# Patient Record
Sex: MALE | Race: WHITE | Employment: FULL TIME | URBAN - METROPOLITAN AREA
[De-identification: names, ages, dates, MRNs, and addresses within clinical notes are randomized per-mention and may not be internally consistent; named-entity substitution may affect disease eponyms.]

---

## 2024-04-08 ENCOUNTER — LAB ENCOUNTER (OUTPATIENT)
Dept: LAB | Facility: REFERENCE LAB | Age: 49
End: 2024-04-08
Attending: FAMILY MEDICINE
Payer: COMMERCIAL

## 2024-04-08 ENCOUNTER — OFFICE VISIT (OUTPATIENT)
Facility: CLINIC | Age: 49
End: 2024-04-08
Payer: COMMERCIAL

## 2024-04-08 VITALS
BODY MASS INDEX: 24.62 KG/M2 | DIASTOLIC BLOOD PRESSURE: 86 MMHG | SYSTOLIC BLOOD PRESSURE: 128 MMHG | OXYGEN SATURATION: 98 % | WEIGHT: 172 LBS | HEIGHT: 70 IN | HEART RATE: 94 BPM

## 2024-04-08 DIAGNOSIS — Z00.00 PHYSICAL EXAM, ANNUAL: Primary | ICD-10-CM

## 2024-04-08 DIAGNOSIS — E78.5 DYSLIPIDEMIA: ICD-10-CM

## 2024-04-08 DIAGNOSIS — K21.9 GASTROESOPHAGEAL REFLUX DISEASE, UNSPECIFIED WHETHER ESOPHAGITIS PRESENT: ICD-10-CM

## 2024-04-08 DIAGNOSIS — Z12.11 COLON CANCER SCREENING: ICD-10-CM

## 2024-04-08 DIAGNOSIS — Z23 NEED FOR VACCINATION: ICD-10-CM

## 2024-04-08 LAB
ALBUMIN SERPL-MCNC: 4.5 G/DL (ref 3.2–4.8)
ALBUMIN/GLOB SERPL: 1.7 {RATIO} (ref 1–2)
ALP LIVER SERPL-CCNC: 87 U/L
ALT SERPL-CCNC: 22 U/L
ANION GAP SERPL CALC-SCNC: 7 MMOL/L (ref 0–18)
AST SERPL-CCNC: 26 U/L (ref ?–34)
BILIRUB SERPL-MCNC: 0.9 MG/DL (ref 0.3–1.2)
BUN BLD-MCNC: 16 MG/DL (ref 9–23)
BUN/CREAT SERPL: 16 (ref 10–20)
CALCIUM BLD-MCNC: 9.2 MG/DL (ref 8.7–10.4)
CHLORIDE SERPL-SCNC: 109 MMOL/L (ref 98–112)
CHOLEST SERPL-MCNC: 196 MG/DL (ref ?–200)
CO2 SERPL-SCNC: 26 MMOL/L (ref 21–32)
CREAT BLD-MCNC: 1 MG/DL
EGFRCR SERPLBLD CKD-EPI 2021: 93 ML/MIN/1.73M2 (ref 60–?)
EST. AVERAGE GLUCOSE BLD GHB EST-MCNC: 105 MG/DL (ref 68–126)
FASTING PATIENT LIPID ANSWER: YES
FASTING STATUS PATIENT QL REPORTED: YES
GLOBULIN PLAS-MCNC: 2.6 G/DL (ref 2.8–4.4)
GLUCOSE BLD-MCNC: 85 MG/DL (ref 70–99)
HBA1C MFR BLD: 5.3 % (ref ?–5.7)
HDLC SERPL-MCNC: 32 MG/DL (ref 40–59)
LDLC SERPL CALC-MCNC: 149 MG/DL (ref ?–100)
NONHDLC SERPL-MCNC: 164 MG/DL (ref ?–130)
OSMOLALITY SERPL CALC.SUM OF ELEC: 294 MOSM/KG (ref 275–295)
POTASSIUM SERPL-SCNC: 4.1 MMOL/L (ref 3.5–5.1)
PROT SERPL-MCNC: 7.1 G/DL (ref 5.7–8.2)
SODIUM SERPL-SCNC: 142 MMOL/L (ref 136–145)
TRIGL SERPL-MCNC: 81 MG/DL (ref 30–149)
VLDLC SERPL CALC-MCNC: 15 MG/DL (ref 0–30)

## 2024-04-08 PROCEDURE — 80061 LIPID PANEL: CPT | Performed by: FAMILY MEDICINE

## 2024-04-08 PROCEDURE — 36415 COLL VENOUS BLD VENIPUNCTURE: CPT | Performed by: FAMILY MEDICINE

## 2024-04-08 PROCEDURE — 80053 COMPREHEN METABOLIC PANEL: CPT | Performed by: FAMILY MEDICINE

## 2024-04-08 PROCEDURE — 90715 TDAP VACCINE 7 YRS/> IM: CPT | Performed by: FAMILY MEDICINE

## 2024-04-08 PROCEDURE — 90471 IMMUNIZATION ADMIN: CPT | Performed by: FAMILY MEDICINE

## 2024-04-08 PROCEDURE — 99386 PREV VISIT NEW AGE 40-64: CPT | Performed by: FAMILY MEDICINE

## 2024-04-08 PROCEDURE — 83036 HEMOGLOBIN GLYCOSYLATED A1C: CPT | Performed by: FAMILY MEDICINE

## 2024-04-08 NOTE — PROGRESS NOTES
Sam Pruitt is a 48 year old male who presents for a complete physical exam.   HPI:     Last PCP visit was 3 years ago.     Hx of HLD. Became a pescartarian 4 years ago. No prior medications.     Hx of GERD. Was on omeprazole many years ago. Had EGD in 03/2023 and normal per patient.     Home BP usually 120s/70s.     Concerns: above  Last colonoscopy:  Never  Last PSA: Never  Diet/exercise: Goes to gym 3-4x/wk. Eats well. See above.   Substance abuse: None  Vaccines- Tdap: likely 2005, Covid: due for booster    REVIEW OF SYSTEMS:   GENERAL: feels well otherwise   SKIN: denies any unusual skin lesions  EYES:denies vision change  HEENT: no hearing changes, negative  LUNGS: denies shortness of breath with exertion  CARDIOVASCULAR: denies chest pain on exertion  GI: denies abdominal pain, constipation or diarrhea. No hematochezia or melena  : denies nocturia or changes in stream  NEURO: denies headaches  PSYCHE: denies depression or anxiety    No current outpatient medications on file.      Past Medical History:   Diagnosis Date    Anxiety 04/01/2011    Mild-Moderate sometimes    Cancer (HCC) 06/01/2015    Basal Cell Skin Cancer once; runs in family    Esophageal reflux 08/01/2012    I used to take Omoprazole.  Last endoscopy was March, 2023    Hyperlipidemia 08/01/1999    It's been up and down, as high as 240 three years ago but never taken medication      Past Surgical History:   Procedure Laterality Date    SKIN SURGERY  06/01/2015    Basal Cell Skin Cancer    VASECTOMY  09/11/2015      Family History   Problem Relation Age of Onset    Depression Mother         Mild and treated    Heart Disorder Mother         Atrial Fibrillation - has pacemaker    Prostate Cancer Maternal Uncle       Social History:  Social History     Socioeconomic History    Marital status:    Tobacco Use    Smoking status: Former    Smokeless tobacco: Never    Tobacco comments:     On and off for 10 years but never again after 9/22/05    Substance and Sexual Activity    Alcohol use: Yes     Alcohol/week: 12.0 standard drinks of alcohol     Types: 2 Glasses of wine, 10 Cans of beer per week    Drug use: Not Currently     Types: Cannabis     Comment: Once in a while, not frequent at all   Other Topics Concern    Caffeine Concern No    Exercise No    Seat Belt No    Special Diet Yes     Comment: Pescatarian for 4 years    Stress Concern No    Weight Concern No           EXAM:     Wt Readings from Last 6 Encounters:   04/08/24 172 lb (78 kg)     Body mass index is 24.68 kg/m².    /86   Pulse 94   Ht 5' 10\" (1.778 m)   Wt 172 lb (78 kg)   SpO2 98%   BMI 24.68 kg/m²      GENERAL: well developed, well nourished, in no apparent distress  SKIN: no rashes, no suspicious lesions  HEENT: atraumatic, normocephalic, MMM, nose normal, Tms & EACs normal  EYES: PERRLA, EOMI, no conjunctival injection  NECK: supple, no adenopathy   LUNGS: CTA b/l, no w/r/r  CARDIO: RRR without murmur  GI: normoactive bowel sounds, NT/ND, no masses, no HSM  EXTREMITIES: no cyanosis, clubbing or edema  NEURO: Alert & oriented, motor and sensory are grossly intact    No results found for: \"CHOLEST\", \"HDL\", \"LDL\", \"TRIGLY\", \"AST\", \"ALT\"   ASSESSMENT AND PLAN:   Sam Pruitt is a 48 year old male who presents for a complete physical exam.    Encounter Diagnoses   Name Primary?    Physical exam, annual Yes    Dyslipidemia     Gastroesophageal reflux disease, unspecified whether esophagitis present     Colon cancer screening     Need for vaccination      Orders Placed This Encounter   Procedures    Hemoglobin A1C    Comp Metabolic Panel (14)    Lipid Panel    TdaP (Boostrix/Adacel) Vaccine (> 7 Y)    Immunization Admin Counseling, 1st Component, 18 years and older       -Check baseline labs.  -May use omeprazole prn for GERD.  -GI referral for colonoscopy.   -Tdap given today.     Discussed with patient the following:  -Risks and benefits of screening for prostate  cancer:  -Colon cancer screening   -Healthy diet including adequate intake of vegetables and fruits, appropriate portion sizes, minimizing highly concentrated carbohydrate foods  -Exercising 30 minutes a day most days of the week   -Importance of regular exercise and weight maintenance/loss  -Diabetes screening   -Cholesterol screening   -Recommendation for yearly influenza vaccine  -Need for Tdap once as an adult and Td booster every 10 years    Meds & Refills for this Visit:  Requested Prescriptions      No prescriptions requested or ordered in this encounter       Imaging & Consults:  TETANUS, DIPHTHERIA TOXOIDS AND ACELLULAR PERTUSIS VACCINE (TDAP), >7 YEARS, IM USE  OP REFERRAL TO [facility] TELEPHONE COLON SCREEN    Return in 1 year for annual physical or sooner as needed.     Lucio Simeon DO  04/08/24   2:48 PM

## 2024-08-02 ENCOUNTER — HOSPITAL ENCOUNTER (OUTPATIENT)
Age: 49
Discharge: HOME | End: 2024-08-02
Payer: COMMERCIAL

## 2024-08-02 ENCOUNTER — NURSE TRIAGE (OUTPATIENT)
Facility: CLINIC | Age: 49
End: 2024-08-02

## 2024-08-02 VITALS
DIASTOLIC BLOOD PRESSURE: 89 MMHG | HEART RATE: 70 BPM | OXYGEN SATURATION: 100 % | SYSTOLIC BLOOD PRESSURE: 134 MMHG | TEMPERATURE: 98 F | RESPIRATION RATE: 18 BRPM

## 2024-08-02 DIAGNOSIS — L03.114 CELLULITIS OF LEFT UPPER EXTREMITY: Primary | ICD-10-CM

## 2024-08-02 PROCEDURE — 99203 OFFICE O/P NEW LOW 30 MIN: CPT | Performed by: NURSE PRACTITIONER

## 2024-08-02 RX ORDER — CEPHALEXIN 500 MG/1
500 CAPSULE ORAL 3 TIMES DAILY
Qty: 30 CAPSULE | Refills: 0 | Status: SHIPPED | OUTPATIENT
Start: 2024-08-02 | End: 2024-08-12

## 2024-08-02 NOTE — ED INITIAL ASSESSMENT (HPI)
Pt here with an insect bite to left upper extremity that he woke up with on Tuesday. Area is red, inflamed and itchy. Denies pain.

## 2024-08-02 NOTE — ED PROVIDER NOTES
Patient Seen in: [facility]      History     Chief Complaint   Patient presents with    Bite Sting,Insect     Stated Complaint: Bite or stung welt growing    Subjective:   HPI  Patient is a 48-year-old male who presents to the immediate care center with concern for redness and swelling to the medial aspect of his left upper arm.  This has been persistent and worsening over the last 3 days.  He cannot recall a provoking agent, but is concern for an insect bite.  He has had no fever or chills; no myalgia or arthralgia; no motor or sensory deficit.        Objective:   Past Medical History:    Anxiety    Mild-Moderate sometimes    Cancer (HCC)    Basal Cell Skin Cancer once; runs in family    Esophageal reflux    I used to take Omoprazole.  Last endoscopy was March, 2023    Hyperlipidemia    It's been up and down, as high as 240 three years ago but never taken medication              Past Surgical History:   Procedure Laterality Date    Skin surgery  06/01/2015    Basal Cell Skin Cancer    Vasectomy  09/11/2015                Social History     Socioeconomic History    Marital status:    Tobacco Use    Smoking status: Former    Smokeless tobacco: Never    Tobacco comments:     On and off for 10 years but never again after 9/22/05   Substance and Sexual Activity    Alcohol use: Yes     Alcohol/week: 12.0 standard drinks of alcohol     Types: 2 Glasses of wine, 10 Cans of beer per week    Drug use: Not Currently     Types: Cannabis     Comment: Once in a while, not frequent at all   Other Topics Concern    Caffeine Concern No    Exercise No    Seat Belt No    Special Diet Yes     Comment: Pescatarian for 4 years    Stress Concern No    Weight Concern No              Review of Systems   Constitutional:  Negative for appetite change, chills and fever.   Musculoskeletal:  Negative for arthralgias and myalgias.   Skin:  Positive for rash.   Neurological:  Negative for weakness and numbness.        Positive for stated Chief Complaint: Bite Sting,Insect    Other systems are as noted in HPI.  Constitutional and vital signs reviewed.      All other systems reviewed and negative except as noted above.    Physical Exam     ED Triage Vitals [08/02/24 1707]   /89   Pulse 70   Resp 18   Temp 98.4 °F (36.9 °C)   Temp src Temporal   SpO2 100 %   O2 Device None (Room air)       Current Vitals:   Vital Signs  BP: 134/89  Pulse: 70  Resp: 18  Temp: 98.4 °F (36.9 °C)  Temp src: Temporal    Oxygen Therapy  SpO2: 100 %  O2 Device: None (Room air)            Physical Exam  Vitals and nursing note reviewed.   Constitutional:       General: He is not in acute distress.  Skin:     General: Skin is warm and dry.          Neurological:      Mental Status: He is alert and oriented to person, place, and time.   Psychiatric:         Behavior: Behavior normal.               ED Course   Labs Reviewed - No data to display                   MDM      Patient was informed that we will begin treatment today for infected insect bite.  This does appear to have developed cellulitis.  He was given prescription for antibiotic to take as directed.  He will follow-up primary care provider next week if symptoms continue or seek prompt reevaluation at any time if symptoms worsen.  He states understanding and agrees with plan.                                 Medical Decision Making  Differential diagnoses considered today include, but are not exclusive of: Local reaction; cellulitis; contact dermatitis; tinea corporis.    Problems Addressed:  Cellulitis of left upper extremity: self-limited or minor problem    Risk  OTC drugs.  Prescription drug management.        Disposition and Plan     Clinical Impression:  1. Cellulitis of left upper extremity         Disposition:  Discharge  8/2/2024  5:43 pm    Follow-up:  Lucio Simeon DO  [address] [place] [id number]  [phone]      As needed          Medications  Prescribed:  Discharge Medication List as of 8/2/2024  5:44 PM        START taking these medications    Details   cephalexin 500 MG Oral Cap Take 1 capsule (500 mg total) by mouth 3 (three) times daily for 10 days., Normal, Disp-30 capsule, R-0

## 2024-08-02 NOTE — TELEPHONE ENCOUNTER
Action Requested: Summary for Provider     []  Critical Lab, Recommendations Needed  [] Need Additional Advice  []   FYI    []   Need Orders  [] Need Medications Sent to Pharmacy  []  Other     SUMMARY:  going to IC for \"insect\" bite eval    Patient reports woke up Tuesday morning to what looked like an insect bite under his triceps which has now spread and have enlarged in diameter.    Per patient sites itch, denies pain.    Patient denies: fever, red streaks, SOB, difficulty breathing, tightness in throat    No openings in FM or IM and advised IC     Patient agrees to plan.      Reason for call: No chief complaint on file.  Onset: Data Unavailable                         Tues triceps itching  itching welt and spreading.      Reason for Disposition   Red or very tender (to touch) area, getting larger over 48 hours after the bite    Protocols used: Insect Bite-A-OH

## 2024-08-05 ENCOUNTER — HOSPITAL ENCOUNTER (OUTPATIENT)
Age: 49
Discharge: HOME | End: 2024-08-05
Payer: COMMERCIAL

## 2024-08-05 VITALS
TEMPERATURE: 97 F | HEART RATE: 81 BPM | RESPIRATION RATE: 20 BRPM | SYSTOLIC BLOOD PRESSURE: 147 MMHG | OXYGEN SATURATION: 100 % | DIASTOLIC BLOOD PRESSURE: 90 MMHG

## 2024-08-05 DIAGNOSIS — Z51.89 ENCOUNTER FOR WOUND RE-CHECK: Primary | ICD-10-CM

## 2024-08-05 PROCEDURE — 99212 OFFICE O/P EST SF 10 MIN: CPT | Performed by: NURSE PRACTITIONER

## 2024-08-05 NOTE — ED PROVIDER NOTES
Patient Seen in: [facility]      History     Chief Complaint   Patient presents with    Bite Sting,Insect     Stated Complaint: Bug bite follow up    Subjective:   49 y/o male with medical conditions as noted below presents with for follow up to a bug bite he sustained around 1 week ago.  Was seen here 3 days ago where, placed on cephalexin for infection.  Patient states provider steve a [place] around the bite and now area of redness has spread beyond.  Endorses that the area is not as red.  No pain, itching, drainage, fever/chills, myalgias.            Objective:   Past Medical History:    Anxiety    Mild-Moderate sometimes    Cancer (HCC)    Basal Cell Skin Cancer once; runs in family    Esophageal reflux    I used to take Omoprazole.  Last endoscopy was March, 2023    Hyperlipidemia    It's been up and down, as high as 240 three years ago but never taken medication              Past Surgical History:   Procedure Laterality Date    Skin surgery  06/01/2015    Basal Cell Skin Cancer    Vasectomy  09/11/2015                Social History     Socioeconomic History    Marital status:    Tobacco Use    Smoking status: Former    Smokeless tobacco: Never    Tobacco comments:     On and off for 10 years but never again after 9/22/05   Substance and Sexual Activity    Alcohol use: Yes     Alcohol/week: 12.0 standard drinks of alcohol     Types: 2 Glasses of wine, 10 Cans of beer per week    Drug use: Not Currently     Types: Cannabis     Comment: Once in a while, not frequent at all   Other Topics Concern    Caffeine Concern No    Exercise No    Seat Belt No    Special Diet Yes     Comment: Pescatarian for 4 years    Stress Concern No    Weight Concern No              Review of Systems   Constitutional:  Negative for chills and fever.   Musculoskeletal:  Negative for arthralgias and myalgias.   Skin:  Positive for wound.       Positive for stated Chief Complaint: Bite Sting,Insect    Other systems are  as noted in HPI.  Constitutional and vital signs reviewed.      All other systems reviewed and negative except as noted above.    Physical Exam     ED Triage Vitals [08/05/24 0825]   /90   Pulse 81   Resp 20   Temp 97.3 °F (36.3 °C)   Temp src Temporal   SpO2 100 %   O2 Device None (Room air)       Current Vitals:   Vital Signs  BP: 147/90  Pulse: 81  Resp: 20  Temp: 97.3 °F (36.3 °C)  Temp src: Temporal    Oxygen Therapy  SpO2: 100 %  O2 Device: None (Room air)            Physical Exam  Vitals and nursing note reviewed.   Constitutional:       Appearance: Normal appearance.   Cardiovascular:      Rate and Rhythm: Normal rate and regular rhythm.   Pulmonary:      Effort: Pulmonary effort is normal.      Breath sounds: Normal breath sounds.   Musculoskeletal:         General: Normal range of motion.   Skin:     General: Skin is warm and dry.      Capillary Refill: Capillary refill takes less than 2 seconds.      Findings: Erythema present.          Neurological:      Mental Status: He is alert and oriented to person, place, and time.               ED Course   Labs Reviewed - No data to display                   MDM                                         Medical Decision Making  Patient is well-appearing. In NAD  I discussed differentials with patient including but not limited to worsening infection versus systemic infection.  Possibly patient to continue to watch area.  Area does not appear to be worsening infection.  Continue antibiotics as prescribed.  Fu with PCP. Return/ ED precautions discussed      Problems Addressed:  Encounter for wound re-check: acute illness or injury    Amount and/or Complexity of Data Reviewed  External Data Reviewed: notes.     Details: IC 08/02/2024        Disposition and Plan     Clinical Impression:  1. Encounter for wound re-check         Disposition:  Discharge  8/5/2024  8:46 am    Follow-up:  Lucio Simeon DO  [address]  [id number]  [phone]                Medications Prescribed:  Discharge Medication List as of 8/5/2024  8:54 AM

## 2024-08-05 NOTE — ED INITIAL ASSESSMENT (HPI)
Pt here with a follow up to a bug bite to his left arm, pt was seen in the IC 3 days ago and was prescribed Keflex, pt states NP steve [place] around bite and redness has spread, pt denies any pain or itching

## 2025-03-05 ENCOUNTER — OFFICE VISIT (OUTPATIENT)
Facility: CLINIC | Age: 50
End: 2025-03-05
Payer: COMMERCIAL

## 2025-03-05 ENCOUNTER — TELEPHONE (OUTPATIENT)
Facility: CLINIC | Age: 50
End: 2025-03-05

## 2025-03-05 VITALS
HEART RATE: 74 BPM | SYSTOLIC BLOOD PRESSURE: 136 MMHG | WEIGHT: 168 LBS | HEIGHT: 70 IN | DIASTOLIC BLOOD PRESSURE: 84 MMHG | BODY MASS INDEX: 24.05 KG/M2

## 2025-03-05 DIAGNOSIS — Z78.9: ICD-10-CM

## 2025-03-05 DIAGNOSIS — Z12.11 COLON CANCER SCREENING: Primary | ICD-10-CM

## 2025-03-05 DIAGNOSIS — K21.9 GASTROESOPHAGEAL REFLUX DISEASE, UNSPECIFIED WHETHER ESOPHAGITIS PRESENT: ICD-10-CM

## 2025-03-05 PROCEDURE — 3008F BODY MASS INDEX DOCD: CPT

## 2025-03-05 PROCEDURE — S0285 CNSLT BEFORE SCREEN COLONOSC: HCPCS

## 2025-03-05 PROCEDURE — 3079F DIAST BP 80-89 MM HG: CPT

## 2025-03-05 PROCEDURE — 3075F SYST BP GE 130 - 139MM HG: CPT

## 2025-03-05 NOTE — TELEPHONE ENCOUNTER
Scheduled for:  Colonoscopy [id number]  Provider Name:  Dr Durand  Date:  3/13/2025  Location:  [facility]  Sedation:  MAC  Time:  (pt is aware that [facility] will call the day before to confirm arrival time)    Prep:  Colyte  Meds/Allergies Reconciled?:  Physician reviewed  Diagnosis with codes:  CCS Z12.11  Was patient informed to call insurance with codes (Y/N):       Referral sent?:  Referral was sent at the time of electronic surgical scheduling.    [facility] or [facility] notified?:  I sent an electronic request to Endo Scheduling and received a confirmation today.       Medication Orders:  Patient is aware to NOT take iron pills, herbal meds and diet supplements for 7 days before exam. Also to NOT take any form of alcohol, recreational drugs and any forms of ED meds 24-72 hours before exam.     Misc Orders:       Further instructions given by staff:  I discussed the prep instructions with the patient which HE verbally understood and is aware that I will send the instructions today via [employer]. Patient was informed about cancellation policy.

## 2025-03-05 NOTE — PATIENT INSTRUCTIONS
1. Schedule colonoscopy with General Pool Endoscopist  Diagnosis: CRC screen  Sedation: MAC or IV  Prep: split dose golytely    2.  bowel prep from pharmacy   You can pick the bowel prep up now and store in a cool, dry place in your home until your scheduled bowel prep start date.    3. Continue all medications as normal for your procedure. DO NOT TAKE: Any form of alcohol, recreational drugs and any forms of erectile dysfunction medications 24 hours prior to procedure.    4. Read all bowel prep instructions carefully. Bowel prep instructions can also be found online at:  [email or web address].[email or web address]     5. AVOID seeds, nuts, popcorn, raw fruits and vegetables for 5 days before procedure    6. If you start any NEW medication after your visit today, please notify us. Certain medications (like iron or weight loss medications) will need to be held before the procedure, or the procedure cannot be performed safely.

## 2025-03-05 NOTE — H&P
[facility] - Gastroenterology                                                                                                  Clinic History and Physical     Chief Complaint   Patient presents with    Colonoscopy Screening       Requesting physician or provider: Lucio Simeon DO    HPI:   Sam Pruitt is a 49 year old year-old male with active diagnoses including dyslipidemia. Prior medical/surgical hx in note table. The patient presents for colon cancer screening evaluation.    #GERD  -hx of GERD, diet controlled. Prior EGD in 2023 normal per patient    #consumes alcohol weekly   -estimates about 10 drinks weekly  -LFTs WNL in April     Patient is here today as a referral from his PCP for evaluation prior to undergoing colonoscopy for CRC screening. Patient denies any GI symptoms of nausea, vomiting, heartburn, dyspepsia, dysphagia, hematemesis, abdominal pain, change in bowel habits, constipation, diarrhea, hematochezia, or melena.  Additionally there is no unintentional weight loss.    Pt is due for CRC screening. We discussed the available screening options for CRC such as FIT and cologuard. They are electing to pursue colonoscopy at this time.     Last colonoscopy: none   Last EGD: March 2023 @ [facility] in [place] Dr. BLUE Barclay  -normal per patient    NSAIDS: none   Tobacco: former  Alcohol: 10 drinks weekly  Marijuana: rare  Illicit drugs: none     FH GI malignancy: none     No history of adverse reaction to sedation  No TOYIN  No anticoagulants/antiplatelet  No pacemaker/defibrillator  No pain medications and/or sleep aides    Wt Readings from Last 6 Encounters:   03/05/25 168 lb (76.2 kg)   04/08/24 172 lb (78 kg)          History, Medications, Allergies, ROS:      Past Medical History:    Anxiety    Mild-Moderate sometimes    Cancer (HCC)    Basal Cell Skin Cancer once; runs in family    Esophageal reflux    I used to take Omoprazole.  Last endoscopy was March, 2023     Hyperlipidemia    It's been up and down, as high as 240 three years ago but never taken medication      Past Surgical History:   Procedure Laterality Date    Skin surgery  06/01/2015    Basal Cell Skin Cancer    Vasectomy  09/11/2015      Family Hx:   Family History   Problem Relation Age of Onset    Depression Mother         Mild and treated    Heart Disorder Mother         Atrial Fibrillation - has pacemaker    Prostate Cancer Maternal Uncle     Colon Cancer Neg       Social History:   Social History     Socioeconomic History    Marital status:    Tobacco Use    Smoking status: Former    Smokeless tobacco: Never    Tobacco comments:     On and off for 10 years but never again after 9/22/05   Substance and Sexual Activity    Alcohol use: Yes     Alcohol/week: 12.0 standard drinks of alcohol     Types: 2 Glasses of wine, 10 Cans of beer per week     Comment: about 10 drinks on weekends    Drug use: Not Currently     Types: Cannabis     Comment: Once in a while, not frequent at all   Other Topics Concern    Caffeine Concern No    Exercise No    Seat Belt No    Special Diet Yes     Comment: Pescatarian for 4 years    Stress Concern No    Weight Concern No        Medications (Active prior to today's visit):  Current Outpatient Medications   Medication Sig Dispense Refill    polyethylene glycol, PEG 3350-KCl-NaBcb-NaCl-NaSulf, 236 g Oral Recon Soln Take 4,000 mL by mouth As Directed. Take 2,000 mL the night before your procedure and 2,000 mL the morning of your procedure. Take as directed by GI clinic. Okay to substitute for generic. 1 each 0       Allergies:  Allergies[1]    ROS:   CONSTITUTIONAL: negative for fevers, chills, sweats  EYES Negative for scleral icterus or redness, and diplopia  HEENT: Negative for hoarseness  RESPIRATORY: Negative for cough and severe shortness of breath  CARDIOVASCULAR: Negative for crushing sub-sternal chest pain  GASTROINTESTINAL: See HPI  GENITOURINARY: Negative for  dysuria  MUSCULOSKELETAL: Negative for arthralgias and myalgias  SKIN: Negative for jaundice, rash or pruritus  NEUROLOGICAL: Negative for dizziness and headaches  BEHAVIOR/PSYCH: Negative for psychotic behavior      PHYSICAL EXAM:   Blood pressure 136/84, pulse 74, height 5' 10\" (1.778 m), weight 168 lb (76.2 kg).    GEN: Alert, no acute distress, well-nourished   HEENT: anicteric sclera, neck supple, trachea midline, MMM, no palpable or tender neck or supraclavicular lymph nodes  CV: RRR, the extremities are warm and well perfused   LUNGS: No increased work of breathing, CTAB  ABDOMEN: Soft, symmetrical, non-tender without distention or guarding. No scars or lesions. Aorta is without bruit or visible pulsation. Umbilicus is midline without herniation. Normoactive bowel sounds are present, No masses, hepatomegaly or splenomegaly noted.  MSK: No erythema, no warmth, no swelling of joints  SKIN: No jaundice, no erythema, no rashes, no lesions  HEMATOLOGIC: No bleeding, no bruising  NEURO: Alert and interactive, QUINTEROS  PSYCH: appropriate mood & affect    Labs/Imaging:     Patient's labs and imaging were reviewed and discussed with patient today.    .  ASSESSMENT/PLAN:   Sam Pruitt is a 49 year old year-old male with active diagnoses including dyslipidemia. Prior medical/surgical hx in note table. The patient presents for colon cancer screening evaluation.    #GERD  -hx of GERD, diet controlled. Prior EGD in 2023 normal per patient    #consumes alcohol weekly   -estimates about 10 drinks weekly  -LFTs WNL in April   -discussed decreasing alcohol use overall, recommend yearly LFTs with PCP. Follow up as needed    #colorectal cancer screening: patient is considered average risk for colon cancer (No immediate family hx of colon cancer) and it is appropriate to proceed with screening colonoscopy. Patient is currently asymptomatic and denies diarrhea, hematochezia, thin-stools or weight loss. We discussed  risks/benefits/alternatives to procedure, including stool testing, they want to proceed with colonoscopy.  *of note his insurance ends at the end of the month. He may need to call when he has insurance again if unable to find opening this month    Recommend:  -Schedule colonoscopy with [facility] Pool Endoscopist  Diagnosis: CRC screen  Sedation: MAC or IV  Prep: split dose golytely    -Anti-platelets and anti-coagulants: N/A   -Diabetes meds: N/A     Endoscopy risk/benefit discussion: I have thoroughly discussed the risks, benefits, and alternatives of endoscopic evaluation with the patient, who demonstrated understanding. This includes the potential risks of bleeding, infection, pain, anesthesia complications, and perforation, which may result in prolonged hospitalization or surgical intervention. All of the patient’s questions were addressed to their satisfaction. The patient has chosen to proceed with the endoscopic procedure, including any necessary interventions such as polypectomy, biopsy, control of bleeding.      Orders This Visit:  No orders of the defined types were placed in this encounter.      Meds This Visit:  Requested Prescriptions     Signed Prescriptions Disp Refills    polyethylene glycol, PEG 3350-KCl-NaBcb-NaCl-NaSulf, 236 g Oral Recon Soln 1 each 0     Sig: Take 4,000 mL by mouth As Directed. Take 2,000 mL the night before your procedure and 2,000 mL the morning of your procedure. Take as directed by GI clinic. Okay to substitute for generic.       Imaging & Referrals:  None       ARLEN Hunter    [facility] Gastroenterology  3/5/2025      The dictation was partially prepared using Dragon Medical voice recognition software. As a result, errors may occur. When identified, these errors have been corrected. While every attempt is made to correct errors during dictation, discrepancies may still exist.              [1] No Known Allergies

## 2025-03-07 ENCOUNTER — TELEPHONE (OUTPATIENT)
Facility: CLINIC | Age: 50
End: 2025-03-07

## 2025-03-07 NOTE — TELEPHONE ENCOUNTER
Schedulers: I don't think pt was given written instructions on how to prepare. Please call pt to discuss and send written instructions to his [employer] account. Thanks!

## 2025-03-07 NOTE — TELEPHONE ENCOUNTER
Returned patient call; patient received instructions at office visit. Just wanted to clarify prep.     Prep clarified; patient expressed understanding

## 2025-03-13 ENCOUNTER — ANESTHESIA (OUTPATIENT)
Dept: ENDOSCOPY | Facility: HOSPITAL | Age: 50
End: 2025-03-13
Payer: COMMERCIAL

## 2025-03-13 ENCOUNTER — ANESTHESIA EVENT (OUTPATIENT)
Dept: ENDOSCOPY | Facility: HOSPITAL | Age: 50
End: 2025-03-13
Payer: COMMERCIAL

## 2025-03-13 ENCOUNTER — HOSPITAL ENCOUNTER (OUTPATIENT)
Facility: HOSPITAL | Age: 50
Setting detail: HOSPITAL OUTPATIENT SURGERY
Discharge: HOME | End: 2025-03-13
Attending: INTERNAL MEDICINE | Admitting: INTERNAL MEDICINE
Payer: COMMERCIAL

## 2025-03-13 VITALS
SYSTOLIC BLOOD PRESSURE: 125 MMHG | HEART RATE: 79 BPM | OXYGEN SATURATION: 96 % | HEIGHT: 70 IN | BODY MASS INDEX: 23.62 KG/M2 | DIASTOLIC BLOOD PRESSURE: 82 MMHG | RESPIRATION RATE: 20 BRPM | WEIGHT: 165 LBS

## 2025-03-13 DIAGNOSIS — Z12.11 COLON CANCER SCREENING: ICD-10-CM

## 2025-03-13 PROCEDURE — 0DBK8ZX EXCISION OF ASCENDING COLON, VIA NATURAL OR ARTIFICIAL OPENING ENDOSCOPIC, DIAGNOSTIC: ICD-10-PCS | Performed by: INTERNAL MEDICINE

## 2025-03-13 PROCEDURE — 45385 COLONOSCOPY W/LESION REMOVAL: CPT | Performed by: INTERNAL MEDICINE

## 2025-03-13 RX ORDER — LIDOCAINE HYDROCHLORIDE 10 MG/ML
INJECTION, SOLUTION EPIDURAL; INFILTRATION; INTRACAUDAL; PERINEURAL AS NEEDED
Status: DISCONTINUED | OUTPATIENT
Start: 2025-03-13 | End: 2025-03-13 | Stop reason: SURG

## 2025-03-13 RX ORDER — SODIUM CHLORIDE, SODIUM LACTATE, POTASSIUM CHLORIDE, CALCIUM CHLORIDE 600; 310; 30; 20 MG/100ML; MG/100ML; MG/100ML; MG/100ML
INJECTION, SOLUTION INTRAVENOUS CONTINUOUS
Status: DISCONTINUED | OUTPATIENT
Start: 2025-03-13 | End: 2025-03-13

## 2025-03-13 RX ORDER — NALOXONE HYDROCHLORIDE 0.4 MG/ML
0.08 INJECTION, SOLUTION INTRAMUSCULAR; INTRAVENOUS; SUBCUTANEOUS ONCE AS NEEDED
Status: DISCONTINUED | OUTPATIENT
Start: 2025-03-13 | End: 2025-03-13

## 2025-03-13 RX ADMIN — LIDOCAINE HYDROCHLORIDE 50 MG: 10 INJECTION, SOLUTION EPIDURAL; INFILTRATION; INTRACAUDAL; PERINEURAL at 09:05:00

## 2025-03-13 NOTE — ANESTHESIA POSTPROCEDURE EVALUATION
Patient: Sam Pruitt    Procedure Summary       Date: 03/13/25 Room / Location: [facility] ENDOSCOPY 05 / [facility] ENDOSCOPY    Anesthesia Start: 0902 Anesthesia Stop: 0929    Procedure: COLONOSCOPY Diagnosis:       Colon cancer screening      (polyp)    Surgeons: Lorenza Durand MD Anesthesiologist: Katherine Powers MD    Anesthesia Type: MAC ASA Status: 2            Anesthesia Type: MAC    Vitals Value Taken Time   /82 03/13/25 1000        Pulse 84 03/13/25 1002   Resp 17 03/13/25 1002   SpO2 97 % 03/13/25 1002   Vitals shown include unfiled device data.    [facility] AN Post Evaluation:   Patient Participation: complete - patient participated  Level of Consciousness: awake  Pain Score: 0  Pain Management: adequate  Airway Patency:patent  Dental exam unchanged from preop  Yes    Nausea/Vomiting: none  Cardiovascular Status: acceptable  Respiratory Status: acceptable  Postoperative Hydration acceptable      Katherine Powers MD  3/13/2025 10:03 AM

## 2025-03-13 NOTE — H&P
History & Physical Examination    Patient Name: Sam Pruitt  MRN: [id number]  CSN: [id number]  YOB: 1975    Diagnosis: crc screening    Prescriptions Prior to Admission[1]  Current Facility-Administered Medications   Medication Dose Route Frequency    lactated ringers infusion   Intravenous Continuous       Allergies: Allergies[2]    Past Medical History:    Anxiety    Mild-Moderate sometimes    Cancer (HCC)    Basal Cell Skin Cancer once; runs in family    Esophageal reflux    I used to take Omoprazole.  Last endoscopy was March, 2023    Hyperlipidemia    It's been up and down, as high as 240 three years ago but never taken medication    Visual impairment    glasses     Past Surgical History:   Procedure Laterality Date    Skin surgery  06/01/2015    Basal Cell Skin Cancer    Vasectomy  09/11/2015     Family History   Problem Relation Age of Onset    Depression Mother         Mild and treated    Heart Disorder Mother         Atrial Fibrillation - has pacemaker    Prostate Cancer Maternal Uncle     Colon Cancer Neg      Social History     Tobacco Use    Smoking status: Former    Smokeless tobacco: Never    Tobacco comments:     On and off for 10 years but never again after 9/22/05   Substance Use Topics    Alcohol use: Yes     Alcohol/week: 12.0 standard drinks of alcohol     Types: 2 Glasses of wine, 10 Cans of beer per week     Comment: about 10 drinks on weekends         SYSTEM Check if Review is Normal Check if Physical Exam is Normal If not normal, please explain:   HEENT [X ] [ X]    NECK  [X ] [ X]    HEART [X ] [ X]    LUNGS [X ] [ X]    ABDOMEN [X ] [ X]    EXTREMITIES [X ] [ X]    OTHER        I have discussed the risks and benefits and alternatives of the procedure with the patient/family.  They understand and agree to proceed with plan of care.   I have reviewed the History and Physical done within the last 30 days.  Any changes noted above.    Lorenza Durand MD  [facility] -  Gastroenterology  3/13/2025  8:25 AM               [1]   Medications Prior to Admission   Medication Sig Dispense Refill Last Dose/Taking    polyethylene glycol, PEG 3350-KCl-NaBcb-NaCl-NaSulf, 236 g Oral Recon Soln Take 4,000 mL by mouth As Directed. Take 2,000 mL the night before your procedure and 2,000 mL the morning of your procedure. Take as directed by GI clinic. Okay to substitute for generic. 1 each 0    [2] No Known Allergies

## 2025-03-13 NOTE — OPERATIVE REPORT
COLONOSCOPY REPORT    Sam Pruitt     1975 Age 49 year old   PCP Lucio Simeon DO Endoscopist Lorenza Durand MD     Date of procedure: 25    Procedure: Colonoscopy w/snare polypectomy    Pre-operative diagnosis: screening    Post-operative diagnosis: see impression    Medications: MAC    Withdrawal time: 11 minutes    Procedure:  Informed consent was obtained from the patient after the risks of the procedure were discussed, including but not limited to bleeding, perforation, aspiration, infection, or possibility of a missed lesion. After discussions of the risks/benefits and alternatives to this procedure, as well as the planned sedation, the patient was placed in the left lateral decubitus position and begun on continuous blood pressure pulse oximetry and EKG monitoring and this was maintained throughout the procedure. Once an adequate level of sedation was obtained a digital rectal exam was completed. Then the lubricated tip of the [id number] diagnostic video colonoscope was inserted and advanced without difficulty to the cecum using the CO2 insufflation technique. The cecum was identified by localizing the trifold, the appendix and the ileocecal valve. Withdrawal was begun with thorough washing and careful examination of the colonic walls and folds. A routine second examination of the cecum/ascending colon was performed. Retroflexion was performed in the rectum. Photodocumentation was obtained. [place] bowel prep score of 9 (Right colon-3; Transverse colon-3, Left colon-3). I then carefully withdrew the instrument from the patient who tolerated the procedure well.     Complications: none.    Findings:   -- MARGARITA: normal rectal tone, no masses palpated.     -- 1 polyp(s) noted as follows:      A. 3 mm polyp in the ascending colon; sessile morphology; cold snare polypectomy and retrieved.    -- A retroflexed view of the rectum revealed no abnormalities.    -- The colonic mucosa throughout the  colon showed normal vascular pattern, without evidence of angioectasias or inflammation.     Impression:   One small polyp resected and retrieved    Recommend:  Pending pathology, repeat colonoscopy in 7 years.    >>>If tissue was obtained and you have not received your pathology results either by phone or letter within 2 weeks, please call our office at [phone].    Specimens: polyp    Blood loss: <1 ml      Lorenza Durand MD  [facility] Gastroenterology

## 2025-03-13 NOTE — DISCHARGE INSTRUCTIONS
Home Care Instructions for Colonoscopy with Sedation    Diet:  - Resume your regular diet as tolerated unless otherwise instructed.  - Start with light meals to minimize bloating.  - Do not drink alcohol today.    Medication:  - If you have questions about resuming your normal medications, please contact your Primary Care Physician.    Activities:  - Take it easy today. Do not return to work today.  - Do not drive today.  - Do not operate any machinery today (including kitchen equipment).    Colonoscopy:  - You may notice some rectal \"spotting\" (a little blood on the toilet tissue) for a day or two after the exam. This is normal.  - If you experience any rectal bleeding (not spotting), persistent tenderness or sharp severe abdominal pains, oral temperature over 100 degrees Fahrenheit, light-headedness or dizziness, or any other problems, contact your doctor.    **If unable to reach your doctor, please go to the [facility] Emergency Room**    - Your referring physician will receive a full report of your examination.  - If you do not hear from your doctor's office within two weeks of your biopsy, please call them for your results.    You may be able to see your laboratory results in [email or web address] between 4 and 7 business days.  In some cases, your physician may not have viewed the results before they are released to [email or web address].  If you have questions regarding your results contact the physician who ordered the test/exam by phone or via [email or web address] by choosing \"Ask a Medical Question.\"

## 2025-03-13 NOTE — ANESTHESIA PREPROCEDURE EVALUATION
Anesthesia PreOp Note    HPI:     Sam Pruitt is a 49 year old male who presents for preoperative consultation requested by: Lorenza Durand MD    Date of Surgery: 3/13/2025    Procedure(s):  COLONOSCOPY  Indication: Colon cancer screening    Relevant Problems   No relevant active problems       NPO:  Last Liquid Consumption Date: 03/13/25  Last Liquid Consumption Time: 0430  Last Solid Consumption Date: 03/12/25  Last Solid Consumption Time: 0900  Last Liquid Consumption Date: 03/13/25          History Review:  Patient Active Problem List    Diagnosis Date Noted    Colon cancer screening 03/13/2025    Dyslipidemia 04/08/2024    Gastroesophageal reflux disease 04/08/2024       Past Medical History:    Anxiety    Mild-Moderate sometimes    Cancer (HCC)    Basal Cell Skin Cancer once; runs in family    Esophageal reflux    I used to take Omoprazole.  Last endoscopy was March, 2023    Hyperlipidemia    It's been up and down, as high as 240 three years ago but never taken medication    Visual impairment    glasses       Past Surgical History:   Procedure Laterality Date    Skin surgery  06/01/2015    Basal Cell Skin Cancer    Vasectomy  09/11/2015       Prescriptions Prior to Admission[1]  Current Medications and Prescriptions Ordered in Epic[2]    Allergies[3]    Family History   Problem Relation Age of Onset    Depression Mother         Mild and treated    Heart Disorder Mother         Atrial Fibrillation - has pacemaker    Prostate Cancer Maternal Uncle     Colon Cancer Neg      Social History     Socioeconomic History    Marital status:    Tobacco Use    Smoking status: Former    Smokeless tobacco: Never    Tobacco comments:     On and off for 10 years but never again after 9/22/05   Vaping Use    Vaping status: Never Used   Substance and Sexual Activity    Alcohol use: Yes     Alcohol/week: 12.0 standard drinks of alcohol     Types: 2 Glasses of wine, 10 Cans of beer per week     Comment: about 10 drinks  on weekends    Drug use: Not Currently     Types: Cannabis     Comment: Once in a while, not frequent at all   Other Topics Concern    Caffeine Concern No    Exercise No    Seat Belt No    Special Diet Yes     Comment: Pescatarian for 4 years    Stress Concern No    Weight Concern No       Available pre-op labs reviewed.             Vital Signs:  Body mass index is 23.68 kg/m².   height is 1.778 m (5' 10\") and weight is 74.8 kg (165 lb). His blood pressure is 154/84 and his pulse is 97. His respiration is 22 and oxygen saturation is 98%.   Vitals:    03/07/25 1107 03/13/25 0820   BP:  154/84   Pulse:  97   Resp:  22   SpO2:  98%   Weight: 74.8 kg (165 lb) 74.8 kg (165 lb)   Height: 1.778 m (5' 10\") 1.778 m (5' 10\")        Anesthesia Evaluation     Patient summary reviewed    Airway   Mallampati: II  TM distance: >3 FB  Neck ROM: full  Dental - Dentition appears grossly intact     Pulmonary - negative ROS and normal exam   Cardiovascular - normal exam    Neuro/Psych - negative ROS     GI/Hepatic/Renal    (+) GERD    Endo/Other - negative ROS   Abdominal                  Anesthesia Plan:   ASA:  2  Plan:   MAC  Informed Consent Plan and Risks Discussed With:  Patient      I have informed Sam CRISS Leemaria esther and/or legal guardian or family member of the nature of the anesthetic plan, benefits, risks including possible dental damage if relevant, major complications, and any alternative forms of anesthetic management.   All of the patient's questions were answered to the best of my ability. The patient desires the anesthetic management as planned.  Katherine Powers MD  3/13/2025 9:01 AM  Present on Admission:  **None**           [1]   Medications Prior to Admission   Medication Sig Dispense Refill Last Dose/Taking    polyethylene glycol, PEG 3350-KCl-NaBcb-NaCl-NaSulf, 236 g Oral Recon Soln Take 4,000 mL by mouth As Directed. Take 2,000 mL the night before your procedure and 2,000 mL the morning of your procedure. Take as  directed by GI clinic. Okay to substitute for generic. 1 each 0    [2]   Current Facility-Administered Medications Ordered in Epic   Medication Dose Route Frequency Provider Last Rate Last Admin    lactated ringers infusion   Intravenous Continuous Lorenza Durand MD         No current [facility]-ordered outpatient medications on file.   [3] No Known Allergies

## 2025-03-18 NOTE — PROGRESS NOTES
Dear Sam,    I reviewed the pathology report from the polyp(s) we completely removed during your recent colonoscopy. The polyp removed was a small right sided hyperplastic polyp, which is a benign growth. However, these are the types of polyps that if not removed could become a colon cancer. All of your polyps were completely removed.     The current health care guidelines recommend that patients with the size, number, and types of polyps you have should repeat their colonoscopy in 7 years to look for any new polyps that might have grown.    If you have further questions please call me at [phone] or message me through [email or web address].    Sincerely,   Lorenza Durand MD

## 2025-03-20 ENCOUNTER — TELEPHONE (OUTPATIENT)
Facility: CLINIC | Age: 50
End: 2025-03-20

## 2025-03-20 NOTE — TELEPHONE ENCOUNTER
----- Message from Lorenza Torres Ma sent at 3/18/2025  2:52 PM CDT -----  Dear Sam,    I reviewed the pathology report from the polyp(s) we completely removed during your recent colonoscopy. The polyp removed was a small right sided hyperplastic polyp, which is a benign growth. However, these are the types of polyps that if not removed could become a colon cancer. All of your polyps were completely removed.     The current health care guidelines recommend that patients with the size, number, and types of polyps you have should repeat their colonoscopy in 7 years to look for any new polyps that might have grown.    If you have further questions please call me at [phone] or message me through [employer].    Sincerely,   Lorenza Durand MD

## 2025-03-20 NOTE — TELEPHONE ENCOUNTER
7  year colonoscopy recall entered. Health maintenance updated.    Colonoscopy done on 3/13/25 and next due on 3/13/32.    Results released via "[employer].

## (undated) DEVICE — V2 SPECIMEN COLLECTION MANIFOLD KIT: Brand: NEPTUNE

## (undated) DEVICE — 60 ML SYRINGE REGULAR TIP: Brand: MONOJECT

## (undated) DEVICE — MEDI-VAC NON-CONDUCTIVE SUCTION TUBING 6MM X 1.8M (6FT.) L: Brand: CARDINAL HEALTH

## (undated) DEVICE — LASSO POLYPECTOMY SNARE: Brand: LASSO

## (undated) DEVICE — KIT CLEAN ENDOKIT 1.1OZ GOWNX2

## (undated) DEVICE — KIT ENDO ORCAPOD 160/180/190

## (undated) DEVICE — V2 SPECIMEN COLLECTION TRAY: Brand: NEPTUNE

## (undated) DEVICE — Device

## (undated) NOTE — LETTER
[facility]  [address], [place], [place]    Authorization for Surgical Operation and Procedure                               I hereby authorize Lorenza Durand MD, my physician and his/her assistants (if applicable), which may include medical students, residents, and/or fellows, to perform the following surgical operation/ procedure and administer such anesthesia as may be determined necessary by my physician: Operation/Procedure name (s) COLONOSCOPY on Sam Pruitt   2.   I recognize that during the surgical operation/procedure, unforeseen conditions may necessitate additional or different procedures than those listed above.  I, therefore, further authorize and request that the above-named surgeon, assistants, or designees perform such procedures as are, in their judgment, necessary and desirable.    3.   My surgeon/physician has discussed prior to my surgery the potential benefits, risks and side effects of this procedure; the likelihood of achieving goals; and potential problems that might occur during recuperation.  They also discussed reasonable alternatives to the procedure, including risks, benefits, and side effects related to the alternatives and risks related to not receiving this procedure.  I have had all my questions answered and I acknowledge that no guarantee has been made as to the result that may be obtained.    4.   Should the need arise during my operation/procedure, which includes change of level of care prior to discharge, I also consent to the administration of blood and/or blood products.  Further, I understand that despite careful testing and screening of blood or blood products by collecting agencies, I may still be subject to ill effects as a result of receiving a blood transfusion and/or blood products.  The following are some, but not all, of the potential risks that can occur: fever and allergic reactions, hemolytic reactions, transmission of diseases such as  Hepatitis, AIDS and Cytomegalovirus (CMV) and fluid overload.  In the event that I wish to have an autologous transfusion of my own blood, or a directed donor transfusion, I will discuss this with my physician.  Check only if Refusing Blood or Blood Products  I understand refusal of blood or blood products as deemed necessary by my physician may have serious consequences to my condition to include possible death. I hereby assume responsibility for my refusal and release the hospital, its personnel, and my physicians from any responsibility for the consequences of my refusal.    o  Refuse   5.   I authorize the use of any specimen, organs, tissues, body parts or foreign objects that may be removed from my body during the operation/procedure for diagnosis, research or teaching purposes and their subsequent disposal by hospital authorities.  I also authorize the release of specimen test results and/or written reports to my treating physician on the hospital medical staff or other referring or consulting physicians involved in my care, at the discretion of the Pathologist or my treating physician.    6.   I consent to the photographing or videotaping of the operations or procedures to be performed, including appropriate portions of my body for medical, scientific, or educational purposes, provided my identity is not revealed by the pictures or by descriptive texts accompanying them.  If the procedure has been photographed/videotaped, the surgeon will obtain the original picture, image, videotape or CD.  The hospital will not be responsible for storage, release or maintenance of the picture, image, tape or CD.    7.   I consent to the presence of a  or observers in the operating room as deemed necessary by my physician or their designees.    8.   I recognize that in the event my procedure results in extended X-Ray/fluoroscopy time, I may develop a skin reaction.    9. If I have a Do Not Attempt  Resuscitation (DNAR) order in place, that status will be suspended while in the operating room, procedural suite, and during the recovery period unless otherwise explicitly stated by me (or a person authorized to consent on my behalf). The surgeon or my attending physician will determine when the applicable recovery period ends for purposes of reinstating the DNAR order.  10. Patients having a sterilization procedure: I understand that if the procedure is successful the results will be permanent and it will therefore be impossible for me to inseminate, conceive, or bear children.  I also understand that the procedure is intended to result in sterility, although the result has not been guaranteed.   11. I acknowledge that my physician has explained sedation/analgesia administration to me including the risk and benefits I consent to the administration of sedation/analgesia as may be necessary or desirable in the judgment of my physician.    I CERTIFY THAT I HAVE READ AND FULLY UNDERSTAND THE ABOVE CONSENT TO OPERATION and/or OTHER PROCEDURE.     ____________________________________  _________________________________        ______________________________  Signature of Patient    Signature of Responsible Person                Printed Name of Responsible Person                                      ____________________________________  _____________________________                ________________________________  Signature of Witness        Date  Time         Relationship to Patient    STATEMENT OF PHYSICIAN My signature below affirms that prior to the time of the procedure; I have explained to the patient and/or his/her legal representative, the risks and benefits involved in the proposed treatment and any reasonable alternative to the proposed treatment. I have also explained the risks and benefits involved in refusal of the proposed treatment and alternatives to the proposed treatment and have answered the patient's  questions. If I have a significant financial interest in a co-management agreement or a significant financial interest in any product or implant, or other significant relationship used in this procedure/surgery, I have disclosed this and had a discussion with my patient.     _____________________________________________________              _____________________________  (Signature of Physician)                                                                                         (Date)                                   (Time)  Patient Name: Sam Pruitt      : 1975      Printed: 3/11/2025     Medical Record #: [id number]                                      Page 1 of 1

## (undated) NOTE — LETTER
[facility]  Administration of Anesthesia  I Sam Pruitt agree to be cared for by a physician anesthesiologist alone and/or with a nurse anesthetist, who is specially trained to monitor me and give me medicine to put me to sleep or keep me comfortable during my procedure    I understand that my anesthesiologist and/or anesthetist is not an employee or agent of [facility] or [employer]. He or she works for [facility] [employer]    As the patient asking for anesthesia services, I agree to:  Allow the anesthesiologist (anesthesia doctor) to give me medicine and do additional procedures as necessary. Some examples are: Starting or using an “IV” to give me medicine, fluids or blood during my procedure, and having a breathing tube placed to help me breathe when I’m asleep (intubation). In the event that my heart stops working properly, I understand that my anesthesiologist will make every effort to sustain my life, unless otherwise directed by [facility] Do Not Resuscitate documents.  Tell my anesthesia doctor before my procedure:  If I am pregnant.  The last time that I ate or drank.  iii. All of the medicines I take (including prescriptions, herbal supplements, and pills I can buy without a prescription (including street drugs/illegal medications). Failure to inform my anesthesiologist about these medicines may increase my risk of anesthetic complications.  iv.If I am allergic to anything or have had a reaction to anesthesia before.  I understand how the anesthesia medicine will help me (benefits).  I understand that with any type of anesthesia medicine there are risks:  The most common risks are: nausea, vomiting, sore throat, muscle soreness, damage to my eyes, mouth, or teeth (from breathing tube placement).  Rare risks include: remembering what happened during my procedure, allergic reactions to medications, injury to my airway, heart, lungs, vision, nerves, or  muscles and in extremely rare instances death.  My doctor has explained to me other choices available to me for my care (alternatives).  Pregnant Patients (“epidural”):  I understand that the risks of having an epidural (medicine given into my back to help control pain during labor), include itching, low blood pressure, difficulty urinating, headache or slowing of the baby’s heart. Very rare risks include infection, bleeding, seizure, irregular heart rhythms and nerve injury.  Regional Anesthesia (“spinal”, “epidural”, & “nerve blocks”):  I understand that rare but potential complications include headache, bleeding, infection, seizure, irregular heart rhythms, and nerve injury.    _____________________________________________________________________________  Patient (or Representative) Signature/Relationship to Patient  Date   Time    _____________________________________________________________________________   Name (if used)    Language/Organization   Time    _____________________________________________________________________________  Nurse Anesthetist Signature     Date   Time  _____________________________________________________________________________  Anesthesiologist Signature     Date   Time  I have discussed the procedure and information above with the patient (or patient’s representative) and answered their questions. The patient or their representative has agreed to have anesthesia services.    _____________________________________________________________________________  Witness        Date   Time  I have verified that the signature is that of the patient or patient’s representative, and that it was signed before the procedure  Patient Name: Sam Pruitt     : 1975                 Printed: 3/11/2025 at 8:25 AM    Medical Record #: [id number]                                            Page 1 of 1  ----------ANESTHESIA CONSENT----------